# Patient Record
Sex: FEMALE | Race: AMERICAN INDIAN OR ALASKA NATIVE
[De-identification: names, ages, dates, MRNs, and addresses within clinical notes are randomized per-mention and may not be internally consistent; named-entity substitution may affect disease eponyms.]

---

## 2017-05-19 ENCOUNTER — HOSPITAL ENCOUNTER (OUTPATIENT)
Dept: HOSPITAL 5 - MAMMO | Age: 62
Discharge: HOME | End: 2017-05-19
Attending: INTERNAL MEDICINE
Payer: COMMERCIAL

## 2017-05-19 DIAGNOSIS — Z12.31: Primary | ICD-10-CM

## 2017-05-19 PROCEDURE — G0202 SCR MAMMO BI INCL CAD: HCPCS

## 2017-05-19 PROCEDURE — 77067 SCR MAMMO BI INCL CAD: CPT

## 2017-05-19 NOTE — MAMMOGRAPHY REPORT
Bilateral mammogram: No previous studies available. CAD study utilized.



Findings:



Scattered lingular parenchyma bilaterally. Benign calcifications. No 

mass. Benign axillary nodes.



Impression:



Benign findings. Annual followup recommended. BI-RADS CATEGORY:  2 = 

Benign



ACR BI-RADS MAMMOGRAPHIC CODES:

0 = Needs additional imaging evaluation; 1 = Negative; 2 = Benign; 3 = 

Probably benign; 4 = Suspicious; 5 = Malignant; 6 = Known biopsy-proven 

malignancy



COMMENT:

      1.   Dense breast tissue, i.e., adenosis, fibrocystic 

            changes, etc., may obscure an underlying neoplasm.

      2.   Approximately 10% of cancers are not detected with

            mammography.

      3.   A negative mammography report should not delay biopsy 

            if a clinically suspicious mass is present. COMMENT:

Patient follow-up letters are generated in [id number].

## 2019-07-26 ENCOUNTER — HOSPITAL ENCOUNTER (OUTPATIENT)
Dept: HOSPITAL 5 - MAMMO | Age: 64
Discharge: HOME | End: 2019-07-26
Attending: INTERNAL MEDICINE
Payer: COMMERCIAL

## 2019-07-26 DIAGNOSIS — E78.00: ICD-10-CM

## 2019-07-26 DIAGNOSIS — R92.8: Primary | ICD-10-CM

## 2019-07-26 NOTE — ULTRASOUND REPORT
LEFT DIGITAL DIAGNOSTIC MAMMOGRAM WITH CAD  -- 7/26/2019

LEFT LIMITED BREAST ULTRASOUND

 

INDICATION: Nodule noted on screening examination 



TECHNIQUE:  Digital left mammographic imaging was performed. Spot compression views were obtained. Li
mited ultrasound was performed. This examination was interpreted with the benefit of Computer-Aided D
etection (CAD) analysis. 



COMPARISON: Screening bilateral mammogram 12/10/2018



FINDINGS: 



Breast Density: The breasts are heterogeneously dense, which may obscure small masses.



The small nodule in the lateral left mid breast is not significantly changed from prior study. Mammog
raphically this resembles a lymph node.



Ultrasound Findings: Targeted ultrasound evaluation was performed of the area of interest.   In the 3
:00 position a 6 mm ovoid nodule is seen with central increased echogenicity consistent with a small 
lymph node. This is thought to correlate with the stable mammographic finding.



IMPRESSION:



BI-RADS Category 2:  Benign. Recommend routine screening mammography





A "normal" or negative report should not discourage follow up or biopsy of a clinically significant f
inding.



A written summary of these findings will be mailed to the patient. The patient will be entered into a
 mammography reporting system which will generate a reminder letter for the patient's next appointmen
t at the appropriate interval.



According to the [employer], yearly mammograms are recommended starting at age 40 
and continuing as long as a woman is in good health.  Breast MRI is recommended for women with an cami
roximately 20-25% or greater lifetime risk of breast cancer, including women with a strong family his
tory of breast or ovarian cancer and women who have been treated for Hodgkin's disease.



Signer Name: Salvatore Lakhani MD 

Signed: 7/26/2019 12:13 PM

 Workstation Name: [id number]

## 2019-07-26 NOTE — MAMMOGRAPHY REPORT
LEFT DIGITAL DIAGNOSTIC MAMMOGRAM WITH CAD  -- 7/26/2019

LEFT LIMITED BREAST ULTRASOUND

 

INDICATION: Nodule noted on screening examination 



TECHNIQUE:  Digital left mammographic imaging was performed. Spot compression views were obtained. Li
mited ultrasound was performed. This examination was interpreted with the benefit of Computer-Aided D
etection (CAD) analysis. 



COMPARISON: Screening bilateral mammogram 12/10/2018



FINDINGS: 



Breast Density: The breasts are heterogeneously dense, which may obscure small masses.



The small nodule in the lateral left mid breast is not significantly changed from prior study. Mammog
raphically this resembles a lymph node.



Ultrasound Findings: Targeted ultrasound evaluation was performed of the area of interest.   In the 3
:00 position a 6 mm ovoid nodule is seen with central increased echogenicity consistent with a small 
lymph node. This is thought to correlate with the stable mammographic finding.



IMPRESSION:



BI-RADS Category 2:  Benign. Recommend routine screening mammography





A "normal" or negative report should not discourage follow up or biopsy of a clinically significant f
inding.



A written summary of these findings will be mailed to the patient. The patient will be entered into a
 mammography reporting system which will generate a reminder letter for the patient's next appointmen
t at the appropriate interval.



According to the [employer], yearly mammograms are recommended starting at age 40 
and continuing as long as a woman is in good health.  Breast MRI is recommended for women with an cami
roximately 20-25% or greater lifetime risk of breast cancer, including women with a strong family his
tory of breast or ovarian cancer and women who have been treated for Hodgkin's disease.



Signer Name: Salvatore Lakhani MD 

Signed: 7/26/2019 12:13 PM

 Workstation Name: [id number]

## 2020-10-23 ENCOUNTER — HOSPITAL ENCOUNTER (OUTPATIENT)
Dept: HOSPITAL 5 - MAMMO | Age: 65
Discharge: HOME | End: 2020-10-23
Attending: INTERNAL MEDICINE
Payer: COMMERCIAL

## 2020-10-23 DIAGNOSIS — Z12.31: Primary | ICD-10-CM

## 2020-10-23 PROCEDURE — 77067 SCR MAMMO BI INCL CAD: CPT
